# Patient Record
Sex: FEMALE | Race: WHITE | NOT HISPANIC OR LATINO | Employment: FULL TIME | URBAN - METROPOLITAN AREA
[De-identification: names, ages, dates, MRNs, and addresses within clinical notes are randomized per-mention and may not be internally consistent; named-entity substitution may affect disease eponyms.]

---

## 2017-01-05 ENCOUNTER — OFFICE VISIT (OUTPATIENT)
Dept: CARDIOLOGY | Facility: CLINIC | Age: 42
End: 2017-01-05

## 2017-01-05 VITALS
HEART RATE: 64 BPM | BODY MASS INDEX: 27.46 KG/M2 | DIASTOLIC BLOOD PRESSURE: 80 MMHG | WEIGHT: 155 LBS | SYSTOLIC BLOOD PRESSURE: 106 MMHG | HEIGHT: 63 IN

## 2017-01-05 DIAGNOSIS — R06.09 DYSPNEA ON EXERTION: Primary | ICD-10-CM

## 2017-01-05 DIAGNOSIS — Q24.9 CONGENITAL HEART DISEASE: ICD-10-CM

## 2017-01-05 PROCEDURE — 99214 OFFICE O/P EST MOD 30 MIN: CPT | Performed by: INTERNAL MEDICINE

## 2017-01-05 NOTE — MR AVS SNAPSHOT
Marine Tracey   1/5/2017 2:30 PM   Office Visit    Dept Phone:  [phone]   Encounter #:  [id number]    Provider:  GILES Chaidez   Department:  [facility]                Your Full Care Plan              Your Updated Medication List          This list is accurate as of: 1/5/17  2:44 PM.  Always use your most recent med list.                albuterol 108 (90 BASE) MCG/ACT inhaler   Commonly known as:  PROVENTIL HFA;VENTOLIN HFA   Inhale 2 puffs Every 4 (Four) Hours As Needed for shortness of air.       furosemide 40 MG tablet   Commonly known as:  LASIX       meloxicam 7.5 MG tablet   Commonly known as:  MOBIC   Take 1 tablet by mouth Daily.       MULTIVITAMIN WOMEN PO       omeprazole 40 MG capsule   Commonly known as:  priLOSEC       potassium chloride 10 MEQ CR capsule   Commonly known as:  MICRO-K       raNITIdine 150 MG tablet   Commonly known as:  ZANTAC       sertraline 100 MG tablet   Commonly known as:  ZOLOFT               We Performed the Following     Ambulatory Referral to Pulmonology       You Were Diagnosed With        Codes Comments    Dyspnea on exertion    -  Primary ICD-10-CM: R06.09  ICD-9-CM: 786.09       Instructions     None    Patient Instructions History      Upcoming Appointments     Visit Type Date Time Department    FOLLOW UP 1/5/2017  2:30 PM [facility]    FOLLOW UP 8/1/2017  3:30 PM [facility]      ENDYMIONhart Signup     Our records indicate that you have an active [employer] account.    You can view your After Visit Summary by going to [employer] and logging in with your [employer] username and password.  If you don't have a [employer] username and password but a parent or guardian has access to your record, the parent or guardian should login with their own [employer] username and password and access your record to view the After Visit Summary.    If you have questions, you can  "email [email or web address] or call [phone] to talk to our MyChart staff.  Remember, MyChart is NOT to be used for urgent needs.  For medical emergencies, dial 911.               Other Info from Your Visit           Your Appointments     Aug 01, 2017  3:30 PM EDT   Follow Up with Anirudh Wood MD   [facility] CARDIOLOGY (--)    [address] [place] [address] [address]  [facility] [address] [phone]           Arrive 15 minutes prior to appointment.              Allergies     No Known Allergies      Reason for Visit     Follow-up     Shortness of Breath     Edema legs    Chest Pain           Vital Signs     Blood Pressure Pulse Height Weight Body Mass Index Smoking Status    106/80 (BP Location: Left arm, Patient Position: Sitting) 64 62.5\" (158.8 cm) 155 lb (70.3 kg) 27.9 kg/m2 Never Smoker      Problems and Diagnoses Noted     Chest pain due to insufficient blood supply to heart    Breathlessness on exertion        "

## 2017-01-05 NOTE — ASSESSMENT & PLAN NOTE
· Echo and nuclear stress testing are unremarkable.  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  · Sleep study also recommended given reported history of nocturnal apnea

## 2017-01-05 NOTE — PROGRESS NOTES
Encounter Date:01/05/2017    Patient ID: Marine Tracey is a 41 y.o. 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  She she continues to have exertional shortness of breath which has not changed since I saw her last.  She also continues to have chest pain that occurs sporadically and resolved sporadically.  She does have to stop what she is doing when the chest pains occur however.  They can last for minutes or hours.  She states that her  notices her stop breathing at night.    Review of Systems   Constitution: Positive for weight gain. Negative for weakness and malaise/fatigue.   Eyes: Negative for vision loss in left eye and vision loss in right eye.   Cardiovascular: Positive for chest pain and leg swelling. Negative for dyspnea on exertion, near-syncope, orthopnea, palpitations, paroxysmal nocturnal dyspnea and syncope.        Waking at night with shortness of breath   Respiratory: Positive for shortness of breath and sleep disturbances due to breathing.    Musculoskeletal: Negative for myalgias.   Neurological: Negative for brief paralysis, excessive daytime sleepiness, focal weakness, numbness and paresthesias.   All other systems reviewed and are negative.      The patient's past medical, social, and family history reviewed in the patient's electronic medical record.    Allergies  Review of patient's allergies indicates no known allergies.    Outpatient Prescriptions Marked as Taking for the 1/5/17 encounter (Office Visit) with GILES Chaidez   Medication Sig Dispense Refill   • albuterol (PROVENTIL HFA;VENTOLIN HFA) 108 (90 BASE) MCG/ACT inhaler Inhale 2 puffs Every 4 (Four) Hours As Needed for shortness of air. 1 inhaler 0   • furosemide (LASIX) 40 MG tablet Take 40 mg by mouth Daily.     • meloxicam (MOBIC) 7.5 MG tablet  "Take 1 tablet by mouth Daily. 7 tablet 0   • Multiple Vitamins-Minerals (MULTIVITAMIN WOMEN PO) Take  by mouth Daily.     • omeprazole (PriLOSEC) 40 MG capsule Take 40 mg by mouth Daily.     • potassium chloride (MICRO-K) 10 MEQ CR capsule Take 10 mEq by mouth Daily.     • raNITIdine (ZANTAC) 150 MG tablet 2 (Two) Times a Day.  2   • sertraline (ZOLOFT) 100 MG tablet Take 100 mg by mouth Daily.           Blood pressure 106/80, pulse 64, height 62.5\" (158.8 cm), weight 155 lb (70.3 kg).  Body mass index is 27.9 kg/(m^2).    Physical Exam   Constitutional: She is oriented to person, place, and time. She appears well-developed and well-nourished.   HENT:   Head: Normocephalic and atraumatic.   Eyes: Pupils are equal, round, and reactive to light. No scleral icterus.   Neck: No JVD present. Carotid bruit is not present. No thyromegaly present.   Cardiovascular: Normal rate, regular rhythm, S1 normal and S2 normal.  Exam reveals no gallop.    No murmur heard.  Pulmonary/Chest: Effort normal and breath sounds normal.   Abdominal: Soft. There is no tenderness.   Neurological: She is alert and oriented to person, place, and time.   Skin: Skin is warm and dry. No cyanosis. Nails show no clubbing.   Psychiatric: She has a normal mood and affect. Her behavior is normal.       Data Review:   Procedures    Echo reviewed and showed normal LV systolic function.  There is no valvular abnormalities appreciated.  Normal RV with no evidence of pulmonary hypertension.    Nuclear stress test showed normal perfusion.  LVEF 62%    I would discuss the CT chest scan performed in October 2016 with Michael Connelly.  He did not appreciate any tracheal compression due to a vascular ring.  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         Problem List Items Addressed This Visit        Cardiology Problems    Congenital heart disease    Overview     · History of vascular ring status post repair at age 10 " months  · CT chest (10/25/16): Right sided aorta with mirror image anatomy.  No pulmonary embolus.  No evidence of tracheal embarrassment         Current Assessment & Plan     I discussed the patients October CT scan findings with Michael Connelly M.D. (radiology).  He did not see any embarrassment of the trachea by residual vascular ring.            Other    Dyspnea on exertion - Primary    Overview     · History of vascular ring status post repair at 10 months old  · Echo (12/15/16): LVEF = 55%.  Normal cardiac valves  · Pharmacologic nuclear stress (12/15/2017):  Normal perfusion.  LVEF 62%         Current Assessment & Plan     · Echo and nuclear stress testing are unremarkable.  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  · Sleep study also recommended given reported history of nocturnal apnea         Relevant Orders    Ambulatory Referral to Pulmonology               · Refer to pulmonary for evaluation of dyspnea and sleep study due to nocturnal apnea  · Return in about 6 months (around 7/5/2017).      Anirudh Wood MD  1/5/2017

## 2017-01-05 NOTE — ASSESSMENT & PLAN NOTE
I discussed the patients October CT scan findings with Michael Connelly M.D. (radiology).  He did not see any embarrassment of the trachea by residual vascular ring.

## 2017-01-05 NOTE — LETTER
January 5, 2017     William Gatica MD  [facility] [address]  [place] [address]    Patient: Marine Tracey   YOB: 1975   Date of Visit: 1/5/2017     Dear Dr. Gavi MD:    Thank you for referring Marine Tracey to me for evaluation. Below are the relevant portions of my assessment and plan of care.    If you have questions, please do not hesitate to call me. I look forward to following Marine along with you.         Sincerely,        GILES Heath        CC: No Recipients    Progress Notes:  Encounter Date:01/05/2017    Patient ID: Marine Tracey is a 41 y.o. 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  She she continues to have exertional shortness of breath which has not changed since I saw her last.  She also continues to have chest pain that occurs sporadically and resolved sporadically.  She does have to stop what she is doing when the chest pains occur however.  They can last for minutes or hours.  She states that her  notices her stop breathing at night.    Review of Systems   Constitution: Positive for weight gain. Negative for weakness and malaise/fatigue.   Eyes: Negative for vision loss in left eye and vision loss in right eye.   Cardiovascular: Positive for chest pain and leg swelling. Negative for dyspnea on exertion, near-syncope, orthopnea, palpitations, paroxysmal nocturnal dyspnea and syncope.        Waking at night with shortness of breath   Respiratory: Positive for shortness of breath and sleep disturbances due to breathing.    Musculoskeletal: Negative for myalgias.   Neurological: Negative for brief paralysis, excessive daytime sleepiness, focal weakness, numbness and paresthesias.   All other systems reviewed and are negative.      The patient's past medical, social, and  "family history reviewed in the patient's electronic medical record.    Allergies  Review of patient's allergies indicates no known allergies.    Outpatient Prescriptions Marked as Taking for the 1/5/17 encounter (Office Visit) with GILES Chaidez   Medication Sig Dispense Refill   • albuterol (PROVENTIL HFA;VENTOLIN HFA) 108 (90 BASE) MCG/ACT inhaler Inhale 2 puffs Every 4 (Four) Hours As Needed for shortness of air. 1 inhaler 0   • furosemide (LASIX) 40 MG tablet Take 40 mg by mouth Daily.     • meloxicam (MOBIC) 7.5 MG tablet Take 1 tablet by mouth Daily. 7 tablet 0   • Multiple Vitamins-Minerals (MULTIVITAMIN WOMEN PO) Take  by mouth Daily.     • omeprazole (PriLOSEC) 40 MG capsule Take 40 mg by mouth Daily.     • potassium chloride (MICRO-K) 10 MEQ CR capsule Take 10 mEq by mouth Daily.     • raNITIdine (ZANTAC) 150 MG tablet 2 (Two) Times a Day.  2   • sertraline (ZOLOFT) 100 MG tablet Take 100 mg by mouth Daily.           Blood pressure 106/80, pulse 64, height 62.5\" (158.8 cm), weight 155 lb (70.3 kg).  Body mass index is 27.9 kg/(m^2).    Physical Exam   Constitutional: She is oriented to person, place, and time. She appears well-developed and well-nourished.   HENT:   Head: Normocephalic and atraumatic.   Eyes: Pupils are equal, round, and reactive to light. No scleral icterus.   Neck: No JVD present. Carotid bruit is not present. No thyromegaly present.   Cardiovascular: Normal rate, regular rhythm, S1 normal and S2 normal.  Exam reveals no gallop.    No murmur heard.  Pulmonary/Chest: Effort normal and breath sounds normal.   Abdominal: Soft. There is no tenderness.   Neurological: She is alert and oriented to person, place, and time.   Skin: Skin is warm and dry. No cyanosis. Nails show no clubbing.   Psychiatric: She has a normal mood and affect. Her behavior is normal.       Data Review:   Procedures    Echo reviewed and showed normal LV systolic function.  There is no valvular abnormalities " appreciated.  Normal RV with no evidence of pulmonary hypertension.    Nuclear stress test showed normal perfusion.  LVEF 62%    I would discuss the CT chest scan performed in October 2016 with Michael Connelyl.  He did not appreciate any tracheal compression due to a vascular ring.  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         Problem List Items Addressed This Visit        Cardiology Problems    Congenital heart disease    Overview     · History of vascular ring status post repair at age 10 months  · CT chest (10/25/16): Right sided aorta with mirror image anatomy.  No pulmonary embolus.  No evidence of tracheal embarrassment         Current Assessment & Plan     I discussed the patients October CT scan findings with Michael Connelly M.D. (radiology).  He did not see any embarrassment of the trachea by residual vascular ring.            Other    Dyspnea on exertion - Primary    Overview     · History of vascular ring status post repair at 10 months old  · Echo (12/15/16): LVEF = 55%.  Normal cardiac valves  · Pharmacologic nuclear stress (12/15/2017):  Normal perfusion.  LVEF 62%         Current Assessment & Plan     · Echo and nuclear stress testing are unremarkable.  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  · Sleep study also recommended given reported history of nocturnal apnea         Relevant Orders    Ambulatory Referral to Pulmonology               · Refer to pulmonary for evaluation of dyspnea and sleep study due to nocturnal apnea  · Return in about 6 months (around 7/5/2017).      Anirudh Wood MD  1/5/2017

## 2017-02-01 ENCOUNTER — OFFICE VISIT (OUTPATIENT)
Dept: PULMONOLOGY | Facility: CLINIC | Age: 42
End: 2017-02-01

## 2017-02-01 VITALS
RESPIRATION RATE: 16 BRPM | HEART RATE: 75 BPM | WEIGHT: 156.4 LBS | BODY MASS INDEX: 27.71 KG/M2 | OXYGEN SATURATION: 97 % | DIASTOLIC BLOOD PRESSURE: 60 MMHG | HEIGHT: 63 IN | TEMPERATURE: 97.9 F | SYSTOLIC BLOOD PRESSURE: 104 MMHG

## 2017-02-01 DIAGNOSIS — G47.33 OSA (OBSTRUCTIVE SLEEP APNEA): ICD-10-CM

## 2017-02-01 DIAGNOSIS — K22.2 ESOPHAGEAL STRICTURE: ICD-10-CM

## 2017-02-01 DIAGNOSIS — R06.09 DYSPNEA ON EXERTION: Primary | ICD-10-CM

## 2017-02-01 PROCEDURE — 94200 LUNG FUNCTION TEST (MBC/MVV): CPT | Performed by: NURSE PRACTITIONER

## 2017-02-01 PROCEDURE — 94375 RESPIRATORY FLOW VOLUME LOOP: CPT | Performed by: NURSE PRACTITIONER

## 2017-02-01 PROCEDURE — 94729 DIFFUSING CAPACITY: CPT | Performed by: NURSE PRACTITIONER

## 2017-02-01 PROCEDURE — 99205 OFFICE O/P NEW HI 60 MIN: CPT | Performed by: NURSE PRACTITIONER

## 2017-02-01 PROCEDURE — 94726 PLETHYSMOGRAPHY LUNG VOLUMES: CPT | Performed by: NURSE PRACTITIONER

## 2017-02-01 NOTE — PROGRESS NOTES
[facility] Pulmonary Evaluation    CHIEF COMPLAINT    Dyspnea    HISTORY OF PRESENT ILLNESS    Marine Tracey is a 41 y.o.female here today for evaluation of some worsening dyspnea and atypical chest pain.  She was sent over by Dr. Wood after an initial cardiac evaluation with a normal echocardiogram and a negative stress test.  She's been having some worsening shortness of breath as well as some intermittent chest pain.  She has no cough or sputum production.  She does complain of some generalized fatigue and dyspnea with exertion.  She does feel that she can get a good breath at times.  The chest pain is difficult to explain, sometimes it's sharp sometimes it's a dull aching feeling.  It is mostly left-sided and under her left breast.   She does have a history of chronic GERD as well as a history of an esophageal dilatation in 2014,by Dr. Crabtree in [facility]  At that time she was unable to even swallow water without difficulty.  She's not sure the symptoms are related or the same as that time.  She's had no nausea vomiting or dysphagia.  On follow-up with her CT scan that she had in October she did have note of a hiatal hernia as well as some esophageal thickening or gastritis.  She takes a PPI daily as well as ranitidine for her reflux.  She does have an occasional reflux symptoms.    Her  does note that she has apnea episodes during the night and does waken her frequently.  She does not sleep well she awakens at night several times.  She does have daytime somnolence as well as fatigue.    Patient Active Problem List   Diagnosis   • RBBB (right bundle branch block)   • Dyspnea on exertion   • Congenital heart disease   • Esophageal stricture   • Atypical chest pain   • PASHA (obstructive sleep apnea)       No Known Allergies    Current Outpatient Prescriptions:   •  albuterol (PROVENTIL HFA;VENTOLIN HFA) 108 (90 BASE) MCG/ACT inhaler, Inhale 2 puffs Every 4 (Four) Hours As Needed for shortness of  "air., Disp: 1 inhaler, Rfl: 0  •  furosemide (LASIX) 40 MG tablet, Take 40 mg by mouth As Needed., Disp: , Rfl:   •  Multiple Vitamins-Minerals (MULTIVITAMIN WOMEN PO), Take  by mouth Daily., Disp: , Rfl:   •  omeprazole (PriLOSEC) 40 MG capsule, Take 40 mg by mouth Daily., Disp: , Rfl:   •  potassium chloride (MICRO-K) 10 MEQ CR capsule, Take 10 mEq by mouth As Needed., Disp: , Rfl:   •  raNITIdine (ZANTAC) 150 MG tablet, 2 (Two) Times a Day., Disp: , Rfl: 2  •  sertraline (ZOLOFT) 100 MG tablet, Take 100 mg by mouth Daily., Disp: , Rfl:   MEDICATION LIST AND ALLERGIES REVIEWED.    Social History   Substance Use Topics   • Smoking status: Former Smoker     Quit date: 2/1/1997   • Smokeless tobacco: Never Used   • Alcohol use No       FAMILY AND SOCIAL HISTORY REVIEWED.  Updated in [facility]  Father recently diagnosed with lymph angioleiomomatosis (by CT scan in [facility])    Review of Systems   Constitutional: Negative for chills, fatigue, fever and unexpected weight change.   HENT: Negative for congestion, nosebleeds, postnasal drip, rhinorrhea, sinus pressure and trouble swallowing.    Respiratory: Positive for cough. Negative for chest tightness, shortness of breath and wheezing.    Cardiovascular: Negative for chest pain and leg swelling.   Gastrointestinal: Negative for abdominal pain, constipation, diarrhea, nausea and vomiting.   Genitourinary: Negative for dysuria, frequency, hematuria and urgency.   Musculoskeletal: Negative for myalgias.   Neurological: Negative for dizziness, weakness, numbness and headaches.   All other systems reviewed and are negative.  .    Visit Vitals   • /60   • Pulse 75   • Temp 97.9 °F (36.6 °C)   • Resp 16   • Ht 63\" (160 cm)   • Wt 156 lb 6.4 oz (70.9 kg)   • SpO2 97%  Comment: RA   • BMI 27.71 kg/m2     Physical Exam   Constitutional: She is oriented to person, place, and time. She appears well-developed and well-nourished.   HENT:   Head: Normocephalic and " atraumatic.   Eyes: EOM are normal. Pupils are equal, round, and reactive to light.   Neck: Normal range of motion. Neck supple.   Cardiovascular: Normal rate and regular rhythm.    No murmur heard.  Pulmonary/Chest: Effort normal and breath sounds normal. No respiratory distress. She has no wheezes. She has no rales.   Abdominal: Soft. Bowel sounds are normal. She exhibits no distension.   Musculoskeletal: Normal range of motion. She exhibits no edema.   Neurological: She is alert and oriented to person, place, and time.   Skin: Skin is warm and dry. No erythema.   Psychiatric: She has a normal mood and affect. Her behavior is normal.   Vitals reviewed.      RESULTS    · Left ventricular function is normal. Estimated EF = 55%.  · The cardiac valves are anatomically and functionally normal  · RVSP 23  · Myocardial perfusion imaging indicates a normal myocardial perfusion study with no evidence of ischemia    CT Chest:   1. No evidence of pulmonary embolism.  2. Right-sided aortic arch with mirror-image anatomy.   3. Small hiatal hernia versus lower esophageal thickening/esophagitis.     PFTs in the office today showed an ratio 72%, FEV1 75%, total lung CA 5%, just diffusion 105%  Normal PFTs    Chest x-ray done in the office today was reviewed      PROBLEM LIST    Problem List Items Addressed This Visit        Respiratory    Dyspnea on exertion - Primary    Overview     · History of vascular ring status post repair at 10 months old  · Echo (12/15/16): LVEF = 55%.  Normal cardiac valves  · Pharmacologic nuclear stress (12/15/2017):  Normal perfusion.  LVEF 62%         Relevant Orders    XR Chest PA & Lateral (Completed)    Pulmonary Function Test (Completed)    Polysomnography 4 or More Parameters    PASHA (obstructive sleep apnea)    Relevant Orders    Home Sleep Study       Digestive    Esophageal stricture    Overview     · Status post dilation                 DISCUSSION    Sleep study due to nocturnal apnea noticed  by her  as well as daytime somnolence and fatigue.  Her PFTs show no obstruction or restriction, are normal functionality.  We reviewed her CT scan of the chest as well as her chest x-ray which have been normal lung parenchyma.    Follow-up with gastroenterology secondary to history of esophageal dilatation and with a comment of a hiatal hernia and esophagitis seen on her CT scan.  This may be the nature of her sporadic chest pain or discomfort.  She will follow-up with Dr. Crabtree in [facility].    Follow up in 6 weeks or after sleep study.    Cecilia Puga, GILES  02/01/20172:01 PM  Electronically signed     Please note that portions of this note were completed with a voice recognition program. Efforts were made to edit the dictations, but occasionally words are mistranscribed.      CC: William Gatica MD

## 2017-02-02 PROBLEM — G47.33 OSA (OBSTRUCTIVE SLEEP APNEA): Status: ACTIVE | Noted: 2017-02-02

## 2017-02-10 ENCOUNTER — HOSPITAL ENCOUNTER (OUTPATIENT)
Dept: SLEEP MEDICINE | Facility: HOSPITAL | Age: 42
Discharge: HOME | End: 2017-02-10
Admitting: NURSE PRACTITIONER

## 2017-02-10 VITALS
OXYGEN SATURATION: 98 % | WEIGHT: 155.4 LBS | DIASTOLIC BLOOD PRESSURE: 52 MMHG | HEIGHT: 63 IN | HEART RATE: 88 BPM | SYSTOLIC BLOOD PRESSURE: 97 MMHG | BODY MASS INDEX: 27.54 KG/M2

## 2017-02-10 DIAGNOSIS — G47.33 OSA (OBSTRUCTIVE SLEEP APNEA): ICD-10-CM

## 2017-02-10 PROCEDURE — 95806 SLEEP STUDY UNATT&RESP EFFT: CPT | Performed by: INTERNAL MEDICINE

## 2017-05-04 ENCOUNTER — OFFICE VISIT (OUTPATIENT)
Dept: PULMONOLOGY | Facility: CLINIC | Age: 42
End: 2017-05-04

## 2017-05-04 DIAGNOSIS — K22.2 ESOPHAGEAL STRICTURE: ICD-10-CM

## 2017-05-04 DIAGNOSIS — R06.09 DYSPNEA ON EXERTION: Primary | ICD-10-CM

## 2017-05-04 DIAGNOSIS — R06.83 SNORING: ICD-10-CM

## 2017-05-04 DIAGNOSIS — G47.33 OSA (OBSTRUCTIVE SLEEP APNEA): ICD-10-CM

## 2017-05-04 PROCEDURE — 99213 OFFICE O/P EST LOW 20 MIN: CPT | Performed by: NURSE PRACTITIONER

## 2017-10-13 ENCOUNTER — OFFICE VISIT (OUTPATIENT)
Dept: PULMONOLOGY | Facility: CLINIC | Age: 42
End: 2017-10-13

## 2017-10-13 VITALS
RESPIRATION RATE: 14 BRPM | TEMPERATURE: 97.2 F | DIASTOLIC BLOOD PRESSURE: 65 MMHG | WEIGHT: 158 LBS | BODY MASS INDEX: 28 KG/M2 | HEART RATE: 67 BPM | HEIGHT: 63 IN | SYSTOLIC BLOOD PRESSURE: 100 MMHG | OXYGEN SATURATION: 99 %

## 2017-10-13 DIAGNOSIS — I45.10 RIGHT BUNDLE BRANCH BLOCK: Primary | ICD-10-CM

## 2017-10-13 DIAGNOSIS — Q24.9 CONGENITAL HEART DISEASE: ICD-10-CM

## 2017-10-13 DIAGNOSIS — G47.33 OSA (OBSTRUCTIVE SLEEP APNEA): ICD-10-CM

## 2017-10-13 DIAGNOSIS — R06.09 DYSPNEA ON EXERTION: Primary | ICD-10-CM

## 2017-10-13 DIAGNOSIS — R06.83 SNORING: ICD-10-CM

## 2017-10-13 PROCEDURE — 99214 OFFICE O/P EST MOD 30 MIN: CPT | Performed by: NURSE PRACTITIONER

## 2017-10-13 PROCEDURE — 94620 PR PULMONARY STRESS TESTING,SIMPLE: CPT | Performed by: NURSE PRACTITIONER

## 2017-10-13 RX ORDER — ALBUTEROL SULFATE 90 UG/1
2 AEROSOL, METERED RESPIRATORY (INHALATION) EVERY 4 HOURS PRN
Qty: 1 INHALER | Refills: 3 | Status: SHIPPED | OUTPATIENT
Start: 2017-10-13 | End: 2018-12-29

## 2017-10-13 NOTE — PROGRESS NOTES
[facility], [place], operated by [facility] Follow up    CHIEF COMPLAINT    Shortness of air    HISTORY OF PRESENT ILLNESS    Marine Tracey is a 42 y.o.female here today for three-month follow-up on her evaluation for dyspnea.  I initially saw her for evaluation in February.  She has had some fairly normal PFTs with some mild obstruction and some blunted flow volume loops but coughs throughout testing.  Her CT scan and chest x-ray had no acute findings in her lung parenchyma, with a chronically asymmetric left volume loss from general surgery.  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  She has followed up with Dr. Perez with echocardiogram as well as stress test.  Her ejection fraction was 55% with normal valves.  The right atrium and right ventricle were normal, her RV systolic pressure was 23.    She did have a sleep study that showed some mild obstructive sleep apnea and at bedtime 13.  She is on a CPAP now.  She works during the night and wears her CPAP during the day as tolerated.    She also has a history of esophageal stricture with dilatation and a hiatal hernia.  She follows up with gastroenterology.  She is on a PPI.    She comes in today for follow-up she still complains of continued dyspnea on exertion with lightheadedness and dizziness if she continues to proceed with activity.  She does have to stop and rest frequently.  Both at work and while playing with her grandchildren.  She has no cough or sputum production.  She denies any wheezing.  She does complain of having some tightness in her chest.        Patient Active Problem List   Diagnosis   • Right bundle branch block   • Dyspnea on exertion   • Congenital heart disease   • Esophageal stricture   • Atypical chest pain   • PASHA (obstructive sleep apnea)   • Snoring       No Known Allergies    Current Outpatient  "Prescriptions:   •  albuterol (PROVENTIL HFA;VENTOLIN HFA) 108 (90 BASE) MCG/ACT inhaler, Inhale 2 puffs Every 4 (Four) Hours As Needed for shortness of air., Disp: 1 inhaler, Rfl: 0  •  furosemide (LASIX) 40 MG tablet, Take 40 mg by mouth As Needed., Disp: , Rfl:   •  Multiple Vitamins-Minerals (MULTIVITAMIN WOMEN PO), Take  by mouth Daily., Disp: , Rfl:   •  omeprazole (PriLOSEC) 40 MG capsule, Take 40 mg by mouth Daily., Disp: , Rfl:   •  potassium chloride (MICRO-K) 10 MEQ CR capsule, Take 10 mEq by mouth As Needed., Disp: , Rfl:   •  raNITIdine (ZANTAC) 150 MG tablet, 2 (Two) Times a Day., Disp: , Rfl: 2  •  sertraline (ZOLOFT) 100 MG tablet, Take 100 mg by mouth Daily., Disp: , Rfl:   MEDICATION LIST AND ALLERGIES REVIEWED.    Social History   Substance Use Topics   • Smoking status: Former Smoker     Quit date: 2/1/1997   • Smokeless tobacco: Never Used   • Alcohol use No       FAMILY AND SOCIAL HISTORY REVIEWED.    Review of Systems   Constitutional: Positive for fatigue. Negative for chills, fever and unexpected weight change.   HENT: Negative for congestion, nosebleeds, postnasal drip, rhinorrhea, sinus pressure and trouble swallowing.    Respiratory: Positive for shortness of breath. Negative for cough, chest tightness and wheezing.         Chest tightness   Cardiovascular: Negative for chest pain, palpitations and leg swelling.   Gastrointestinal: Negative for abdominal pain, constipation, diarrhea, nausea and vomiting.   Genitourinary: Negative for dysuria, frequency, hematuria and urgency.   Musculoskeletal: Negative for myalgias.   Neurological: Positive for light-headedness. Negative for dizziness, weakness, numbness and headaches.   All other systems reviewed and are negative.  .    /65  Pulse 67  Temp 97.2 °F (36.2 °C)  Resp 14  Ht 63\" (160 cm)  Wt 158 lb (71.7 kg)  SpO2 99% Comment: RA  BMI 27.99 kg/m2    Physical Exam   Constitutional: She is oriented to person, place, and time. She " appears well-developed and well-nourished.   HENT:   Head: Normocephalic and atraumatic.   Eyes: EOM are normal. Pupils are equal, round, and reactive to light.   Neck: Normal range of motion. Neck supple.   Cardiovascular: Normal rate and regular rhythm.    No murmur heard.  Pulmonary/Chest: Effort normal and breath sounds normal. No respiratory distress. She has no wheezes. She has no rales.   Abdominal: Soft. Bowel sounds are normal. She exhibits no distension.   Musculoskeletal: Normal range of motion. She exhibits no edema.   Neurological: She is alert and oriented to person, place, and time.   Skin: Skin is warm and dry. No erythema.   Psychiatric: She has a normal mood and affect. Her behavior is normal.   Vitals reviewed.        RESULTS        PROBLEM LIST    Problem List Items Addressed This Visit        Respiratory    Dyspnea on exertion - Primary    Overview     · History of vascular ring status post repair at 10 months old  · Echo (12/15/16): LVEF = 55%.  Normal cardiac valves  · Pharmacologic nuclear stress (12/15/2017):  Normal perfusion.  LVEF 62%         PASHA (obstructive sleep apnea)    Overview     Home Sleep Study 2/10/17 - Mild PASHA - AHI 13         Snoring            DISCUSSION    Mrs. Tracey has a fairly complicated case, with continued dyspnea with exertion as well as complaints of chest tightness and lightheadedness.  She has been on Breo now as well as albuterol without any relief in her symptoms.  Her flow volume loops appear had a very very mild obstruction and some blunting but she coughed throughout the procedure.  I would like to repeat her PFTs on her next visit to see if this has resolved with the resolution of the cough.  She has no evidence of any tracheal stenosis or dynamic airway collapse.  She has no cough or no wheezing.  She is being treated for objective sleep apnea with a CPAP, she does wear it sporadically during the day, she works at night.  Her GERD is well controlled at  this time on a PPI and reflux precautions.    With her history of some congenital surgery at 10 months and may be of benefit to have a right heart catheterization to rule out any pulmonary hypertension.  Her echocardiogram had a normal RV systolic pressure, but it may be nice to make sure that we'll have any underlying pulmonary hypertension as a cause for her dyspnea on exertion, lightheadedness with dizziness, and chest pressure.  She has followed up with Dr. Davies in the past with [facility] cardiology, I'll refer her back to him for his thoughts.    If her heart cavitation is again negative or nonrevealing, her next course of action would be to proceed with bronchoscopy for airway visualization.    This was all discussed with the patient today in the office.  She is in full agreement.    At this time will continue on inhaled combination therapy as well as albuterol as needed to see if it helps any of her symptoms.    Cecilia Puga, APRN  10/13/24417:02 AM  Electronically signed     Please note that portions of this note were completed with a voice recognition program. Efforts were made to edit the dictations, but occasionally words are mistranscribed.      CC: William Gatica MD

## 2017-10-17 ENCOUNTER — OFFICE VISIT (OUTPATIENT)
Dept: CARDIOLOGY | Facility: CLINIC | Age: 42
End: 2017-10-17

## 2017-10-17 VITALS
HEIGHT: 63 IN | SYSTOLIC BLOOD PRESSURE: 98 MMHG | WEIGHT: 160 LBS | HEART RATE: 65 BPM | DIASTOLIC BLOOD PRESSURE: 62 MMHG | BODY MASS INDEX: 28.35 KG/M2

## 2017-10-17 DIAGNOSIS — R07.9 CHEST PAIN, UNSPECIFIED TYPE: ICD-10-CM

## 2017-10-17 DIAGNOSIS — Q24.9 CONGENITAL HEART DISEASE: Primary | ICD-10-CM

## 2017-10-17 DIAGNOSIS — R06.09 DYSPNEA ON EXERTION: ICD-10-CM

## 2017-10-17 PROCEDURE — 99214 OFFICE O/P EST MOD 30 MIN: CPT | Performed by: INTERNAL MEDICINE

## 2017-10-17 RX ORDER — DULOXETIN HYDROCHLORIDE 60 MG/1
60 CAPSULE, DELAYED RELEASE ORAL DAILY
COMMUNITY

## 2017-10-17 NOTE — ASSESSMENT & PLAN NOTE
The patient has a history of congenital heart disease with life limiting atypical chest pain and shortness of breath.  Despite recent testing suggesting no significant cardiac abnormality, I have concern her symptoms may be a manifestation of her congenital heart disease.  I like to seek opinion from an adult congenital heart disease specialist and in referring her to Dr. Andrew Leventhal at  for evaluation.  Cardiac MRI was contemplated today, but I will defer to Dr. Leventhal.

## 2017-10-17 NOTE — ASSESSMENT & PLAN NOTE
· Functional class III symptoms.  · If cardiac evaluation unrevealing, will refer back to the pulmonary for bronchoscopy.

## 2017-10-17 NOTE — PROGRESS NOTES
"Encounter Date:10/17/2017    Patient ID: Marine Tracey is a 42 y.o. female who resides in [place], [place]. She works in the [employer].    CC/Reason for visit:  Shortness of Breath; Chest Pain (Chest pressure ); Fatigue; Dizziness; and RBBB            Marine Tracey returns to the office today in follow-up of her congenital heart disease, chest pain, and shortness of breath.  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  She remembers being told that she had a vascular ring.  A recent CT shows right sided aorta with mirror image anatomy.  When I saw her for initial evaluation approximately one year ago, she underwent an echocardiogram and nuclear stress test.  Both tests appeared normal.  She was referred to pulmonary and is undergone a pulmonary evaluation which has not yielded pathology to account for her symptoms.    The patient states that she has intermittent chest pain symptoms which occur both at rest and with exertion and will require her to sit down when they occur.  They resolve spontaneously after several minutes.  She also complains of poor exercise capacity.  She is becoming limited in her ability to physically function at work and has had to really get herself to \"camera duty\" to avoid physical activities.  She denies orthopnea or PND.  She denies palpitations.    Review of Systems   Constitution: Negative for weakness and malaise/fatigue.   Eyes: Negative for vision loss in left eye and vision loss in right eye.   Cardiovascular: Positive for chest pain and leg swelling. Negative for dyspnea on exertion, near-syncope, orthopnea, palpitations, paroxysmal nocturnal dyspnea and syncope.   Respiratory: Positive for shortness of breath, sleep disturbances due to breathing and snoring.    Musculoskeletal: Negative for myalgias.   Gastrointestinal: Positive for " "heartburn.   Genitourinary: Positive for frequency.   Neurological: Positive for dizziness. Negative for brief paralysis, excessive daytime sleepiness, focal weakness, numbness and paresthesias.   All other systems reviewed and are negative.      The patient's past medical, social, family history and ROS reviewed in the patient's electronic medical record.    Allergies  Review of patient's allergies indicates no known allergies.    Outpatient Prescriptions Marked as Taking for the 10/17/17 encounter (Office Visit) with Anirudh Wood MD   Medication Sig Dispense Refill   • albuterol (PROVENTIL HFA;VENTOLIN HFA) 108 (90 Base) MCG/ACT inhaler Inhale 2 puffs Every 4 (Four) Hours As Needed for Shortness of Air. 1 inhaler 3   • DULoxetine (CYMBALTA) 60 MG capsule Take 60 mg by mouth Daily.     • Multiple Vitamins-Minerals (MULTIVITAMIN WOMEN PO) Take  by mouth Daily.     • omeprazole (PriLOSEC) 40 MG capsule Take 40 mg by mouth Daily.     • raNITIdine (ZANTAC) 150 MG tablet 2 (Two) Times a Day.  2   • [DISCONTINUED] sertraline (ZOLOFT) 100 MG tablet Take 100 mg by mouth Daily.           Blood pressure 98/62, pulse 65, height 63\" (160 cm), weight 160 lb (72.6 kg).  Body mass index is 28.34 kg/(m^2).    Physical Exam   Constitutional: She is oriented to person, place, and time. She appears well-developed and well-nourished.   HENT:   Head: Normocephalic and atraumatic.   Eyes: Pupils are equal, round, and reactive to light. No scleral icterus.   Neck: No JVD present. Carotid bruit is not present. No thyromegaly present.   Cardiovascular: Normal rate, regular rhythm, S1 normal and S2 normal.  Exam reveals no gallop.    No murmur heard.  Pulmonary/Chest: Effort normal and breath sounds normal.   Abdominal: Soft. There is no hepatosplenomegaly. There is no tenderness.   Neurological: She is alert and oriented to person, place, and time.   Skin: Skin is warm and dry. No cyanosis. Nails show no clubbing. "   Psychiatric: She has a normal mood and affect. Her behavior is normal.       Data Review:   Procedures       Problem List Items Addressed This Visit        Cardiovascular and Mediastinum    Congenital heart disease - Primary    Overview     · History of vascular ring status post repair at age 10 months  · CT chest (10/25/16): Right sided aorta with mirror image anatomy.  No pulmonary embolus.  No evidence of tracheal embarrassment  · Echo (12/15/16): LVEF = 55%.  Normal cardiac valves.  Normal RVSP.  · Pharmacologic nuclear stress (12/15/2017):  Normal perfusion.  LVEF 62%         Current Assessment & Plan     The patient has a history of congenital heart disease with life limiting atypical chest pain and shortness of breath.  Despite recent testing suggesting no significant cardiac abnormality, I have concern her symptoms may be a manifestation of her congenital heart disease.  I like to seek opinion from an adult congenital heart disease specialist and in referring her to Dr. Andrew Leventhal at  for evaluation.  Cardiac MRI was contemplated today, but I will defer to Dr. Leventhal.         Relevant Orders    Ambulatory Referral to Cardiology (Completed)       Respiratory    Dyspnea on exertion    Overview     · History of vascular ring status post repair at 10 months old  · Echo (12/15/16): LVEF = 55%.  Normal cardiac valves.  Normal RVSP.  · Pharmacologic nuclear stress (12/15/2017):  Normal perfusion.  LVEF 62%         Current Assessment & Plan     · Functional class III symptoms.  · If cardiac evaluation unrevealing, will refer back to the pulmonary for bronchoscopy.            Nervous and Auditory    Chest pain    Overview     · Nuclear Stress Test (2/6/2014): Normal perfusion.  Normal LVEF  · CT Chest Angiogram with Contrast (10/26/2016): No pulmonary embolus.  Right-sided aortic arch with mirror image anatomy.  · Echocardiogram (11/3/2016): LVEF 55%.  Cardiac.  Anatomically and functionally normal.   RVSP 26 mmHg                    · Refer to Dr. Andrew Leventhal for evaluation of adult congenital heart disease evaluation  · If cardiac evaluation unremarkable, will be referred back to pulmonary for possible bronchoscopy.    Anirudh Wood MD  10/17/2017     Scribed for Anirudh Wood MD by Tapan Houston. 10/17/2017  1:14 PM

## 2018-12-29 ENCOUNTER — OFFICE VISIT (OUTPATIENT)
Dept: RETAIL CLINIC | Facility: CLINIC | Age: 43
End: 2018-12-29

## 2018-12-29 VITALS
DIASTOLIC BLOOD PRESSURE: 70 MMHG | WEIGHT: 158 LBS | OXYGEN SATURATION: 98 % | HEART RATE: 80 BPM | RESPIRATION RATE: 12 BRPM | SYSTOLIC BLOOD PRESSURE: 121 MMHG | TEMPERATURE: 97.6 F | BODY MASS INDEX: 28 KG/M2 | HEIGHT: 63 IN

## 2018-12-29 DIAGNOSIS — R05.9 COUGHING: ICD-10-CM

## 2018-12-29 DIAGNOSIS — J01.41 ACUTE RECURRENT PANSINUSITIS: Primary | ICD-10-CM

## 2018-12-29 PROCEDURE — 99213 OFFICE O/P EST LOW 20 MIN: CPT | Performed by: NURSE PRACTITIONER

## 2018-12-29 RX ORDER — AMOXICILLIN AND CLAVULANATE POTASSIUM 875; 125 MG/1; MG/1
1 TABLET, FILM COATED ORAL 2 TIMES DAILY
Qty: 20 TABLET | Refills: 0 | Status: SHIPPED | OUTPATIENT
Start: 2018-12-29

## 2018-12-29 RX ORDER — DEXTROMETHORPHAN HYDROBROMIDE AND PROMETHAZINE HYDROCHLORIDE 15; 6.25 MG/5ML; MG/5ML
5 SYRUP ORAL 4 TIMES DAILY PRN
Qty: 240 ML | Refills: 0 | Status: SHIPPED | OUTPATIENT
Start: 2018-12-29 | End: 2019-01-08

## 2018-12-29 RX ORDER — PANTOPRAZOLE SODIUM 40 MG/1
40 TABLET, DELAYED RELEASE ORAL 2 TIMES DAILY
Refills: 1 | COMMUNITY
Start: 2018-11-30

## 2018-12-29 RX ORDER — PSEUDOEPHEDRINE HCL 120 MG/1
120 TABLET, FILM COATED, EXTENDED RELEASE ORAL EVERY 12 HOURS
Qty: 20 TABLET | Refills: 0 | Status: SHIPPED | OUTPATIENT
Start: 2018-12-29 | End: 2019-01-08

## 2018-12-29 RX ORDER — PREDNISONE 10 MG/1
TABLET ORAL DAILY
Qty: 21 EACH | Refills: 0 | Status: SHIPPED | OUTPATIENT
Start: 2018-12-29 | End: 2019-01-04

## 2018-12-29 NOTE — PROGRESS NOTES
"Subjective   Marine Tracey is a 43 y.o. female.     Sinusitis   This is a recurrent problem. The current episode started in the past 7 days. The problem has been gradually worsening since onset. There has been no fever. The pain is severe. Associated symptoms include congestion, coughing, headaches, a hoarse voice, sinus pressure and swollen glands. Pertinent negatives include no chills, ear pain, neck pain, shortness of breath, sneezing or sore throat. Past treatments include nothing.        The following portions of the patient's history were reviewed and updated as appropriate: allergies, current medications, past medical history, past social history, past surgical history and problem list.    Review of Systems   Constitutional: Negative for appetite change, chills and fever.   HENT: Positive for congestion, hoarse voice, postnasal drip, rhinorrhea, sinus pressure and sinus pain. Negative for ear pain, sneezing, sore throat and trouble swallowing.    Eyes: Negative.    Respiratory: Positive for cough and chest tightness. Negative for shortness of breath and wheezing.    Cardiovascular: Negative.    Gastrointestinal: Negative for abdominal pain, diarrhea, nausea and vomiting.   Musculoskeletal: Negative.  Negative for neck pain.   Skin: Negative.    Neurological: Positive for headaches. Negative for dizziness.   Hematological: Positive for adenopathy.        /70   Pulse 80   Temp 97.6 °F (36.4 °C) (Oral)   Resp 12   Ht 160 cm (63\")   Wt 71.7 kg (158 lb)   SpO2 98%   BMI 27.99 kg/m²      Objective   Physical Exam   Constitutional: She is oriented to person, place, and time. Vital signs are normal. She appears well-developed and well-nourished.   HENT:   Head: Normocephalic.   Right Ear: External ear and ear canal normal. No drainage, swelling or tenderness. Tympanic membrane is bulging. Tympanic membrane is not erythematous.   Left Ear: External ear and ear canal normal. No drainage, swelling or " tenderness. Tympanic membrane is bulging. Tympanic membrane is not erythematous.   Nose: Mucosal edema and rhinorrhea present. Right sinus exhibits maxillary sinus tenderness and frontal sinus tenderness. Left sinus exhibits maxillary sinus tenderness and frontal sinus tenderness.   Mouth/Throat: Uvula is midline, oropharynx is clear and moist and mucous membranes are normal. Tonsils are 0 on the right. Tonsils are 0 on the left. No tonsillar exudate.   Eyes: Conjunctivae are normal. Pupils are equal, round, and reactive to light.   Cardiovascular: Normal rate, regular rhythm, S1 normal, S2 normal and normal heart sounds.   Pulmonary/Chest: Effort normal and breath sounds normal. No respiratory distress. She has no wheezes.   Lymphadenopathy:        Head (right side): Tonsillar adenopathy present.        Head (left side): Tonsillar adenopathy present.     She has no cervical adenopathy.   Neurological: She is alert and oriented to person, place, and time.   Skin: Skin is warm, dry and intact. No rash noted.   Psychiatric: She has a normal mood and affect. Her speech is normal and behavior is normal. Thought content normal.   Vitals reviewed.      Assessment/Plan   Marine was seen today for sinusitis.    Diagnoses and all orders for this visit:    Acute recurrent pansinusitis  -     amoxicillin-clavulanate (AUGMENTIN) 875-125 MG per tablet; Take 1 tablet by mouth 2 (Two) Times a Day.  -     pseudoephedrine (SUDAFED) 120 MG 12 hr tablet; Take 1 tablet by mouth Every 12 (Twelve) Hours for 10 days.  -     PredniSONE (DELTASONE) 10 MG (21) tablet pack; Take  by mouth Daily for 6 days. Use as directed on package    Coughing  -     PredniSONE (DELTASONE) 10 MG (21) tablet pack; Take  by mouth Daily for 6 days. Use as directed on package  -     promethazine-dextromethorphan (PROMETHAZINE-DM) 6.25-15 MG/5ML syrup; Take 5 mL by mouth 4 (Four) Times a Day As Needed for Cough for up to 10 days.

## 2020-01-13 ENCOUNTER — TREATMENT (OUTPATIENT)
Dept: PHYSICAL THERAPY | Facility: CLINIC | Age: 45
End: 2020-01-13

## 2020-01-13 ENCOUNTER — TRANSCRIBE ORDERS (OUTPATIENT)
Dept: PHYSICAL THERAPY | Facility: CLINIC | Age: 45
End: 2020-01-13

## 2020-01-13 DIAGNOSIS — M65.4 DE QUERVAIN'S TENOSYNOVITIS, RIGHT: Primary | ICD-10-CM

## 2020-01-13 DIAGNOSIS — M65.4 DE QUERVAIN'S DISEASE (TENOSYNOVITIS): Primary | ICD-10-CM

## 2020-01-13 PROCEDURE — L3906 WHO W/O JOINTS CF: HCPCS | Performed by: PHYSICAL THERAPIST

## 2020-01-15 NOTE — PROGRESS NOTES
Marine BOLDEN Alexy 1975   Diagnosis/ Surgery: Right DeQuervain's Syndrome/wrist pain          Date Of Onset: Spring 2019    Date Of Surgery:Injection today    Hand Dominance: Right  History of Present Condition: pain started about 8 months ago when she was straightening up a bed spread. Had 1 injection about 6 months ago helped for about 4 months.  Injected today, sent for immobilizing splinting.  Medical/Vocational History/ Medications: PMH unremarkable.  Works at [employer].    Pain: 7-8/10    Edema: Mild  Sensibility: WNL   Wound Status:N/A  ROM/ Strength: Finkelstein's test +    Splinting:  · Patient was measure and fit with a custom fabricated volar forearm based wrist/hand/thumb immobilization splint with IP joint free.   · Patient was instructed in wearing schedule, precautions and care of the splint during this visit.   · Patient was instructed in proper donning/doffing of splint.   Assessment:  · Patient was fitted and appropriate splint was fabricated this date.  · Patient reported that splint was comfortable and had no complications with the fit of the splint.  · Patient was instructed and patient verbalized understanding of precautions, wear and care of the splint.   · Patient demonstrated independent donning/doffing of splint during treatment today.  Goals:  · Patient was fitted properly with appropriate splint for diagnosis  · Patient was educated on precautions, wear schedule and care of splint  · Patient demonstrated independence with donning/doffing of the splint.  · Splint was provided to Protect Healing Structures, Restrict Mobility, Improve joint alignment.  Plan:  · No additional treatment is required for this patient at this time. The patient is therefore discharged from therapy.  · Patient advised to contact therapist with any additional questions or concerns regarding the fit and function of the splint.  · Patient will be seen for splint issues as needed   · Wear  Instructions: Off for hygiene       PT SIGNATURE: Jerry Carlin, PT, CHT  DATE TREATMENT INITIATED: 1/13/2020

## 2020-07-15 ENCOUNTER — HOSPITAL ENCOUNTER (OUTPATIENT)
Age: 45
End: 2020-07-15
Payer: COMMERCIAL

## 2020-07-15 DIAGNOSIS — N23: Primary | ICD-10-CM

## 2020-07-15 PROCEDURE — 74176 CT ABD & PELVIS W/O CONTRAST: CPT

## 2020-07-23 ENCOUNTER — HOSPITAL ENCOUNTER (OUTPATIENT)
Age: 45
End: 2020-07-23
Payer: COMMERCIAL

## 2020-07-23 DIAGNOSIS — R31.0: Primary | ICD-10-CM

## 2020-07-23 PROCEDURE — 87186 SC STD MICRODIL/AGAR DIL: CPT

## 2020-07-23 PROCEDURE — 87088 URINE BACTERIA CULTURE: CPT

## 2020-07-23 PROCEDURE — 87086 URINE CULTURE/COLONY COUNT: CPT

## 2020-07-30 ENCOUNTER — HOSPITAL ENCOUNTER (OUTPATIENT)
Age: 45
End: 2020-07-30
Payer: COMMERCIAL

## 2020-07-30 DIAGNOSIS — Z01.818: Primary | ICD-10-CM

## 2020-07-30 DIAGNOSIS — R31.9: ICD-10-CM

## 2020-07-30 PROCEDURE — 36415 COLL VENOUS BLD VENIPUNCTURE: CPT

## 2020-07-30 PROCEDURE — 86328 IA NFCT AB SARSCOV2 COVID19: CPT

## 2020-07-31 ENCOUNTER — HOSPITAL ENCOUNTER (OUTPATIENT)
Dept: HOSPITAL 22 - OUTP | Age: 45
Discharge: HOME | End: 2020-07-31
Payer: COMMERCIAL

## 2020-07-31 VITALS — BODY MASS INDEX: 25.7 KG/M2

## 2020-07-31 VITALS
OXYGEN SATURATION: 100 % | SYSTOLIC BLOOD PRESSURE: 100 MMHG | TEMPERATURE: 98.6 F | HEART RATE: 68 BPM | RESPIRATION RATE: 18 BRPM | DIASTOLIC BLOOD PRESSURE: 68 MMHG

## 2020-07-31 VITALS
HEART RATE: 74 BPM | OXYGEN SATURATION: 98 % | SYSTOLIC BLOOD PRESSURE: 93 MMHG | DIASTOLIC BLOOD PRESSURE: 61 MMHG | TEMPERATURE: 97.9 F | RESPIRATION RATE: 18 BRPM

## 2020-07-31 DIAGNOSIS — Q33.6: ICD-10-CM

## 2020-07-31 DIAGNOSIS — Z79.899: ICD-10-CM

## 2020-07-31 DIAGNOSIS — Z83.3: ICD-10-CM

## 2020-07-31 DIAGNOSIS — I51.9: ICD-10-CM

## 2020-07-31 DIAGNOSIS — K21.9: ICD-10-CM

## 2020-07-31 DIAGNOSIS — R31.29: ICD-10-CM

## 2020-07-31 DIAGNOSIS — Z85.9: ICD-10-CM

## 2020-07-31 DIAGNOSIS — Z87.891: ICD-10-CM

## 2020-07-31 DIAGNOSIS — N35.92: Primary | ICD-10-CM

## 2020-07-31 DIAGNOSIS — Z82.49: ICD-10-CM

## 2020-07-31 DIAGNOSIS — Z90.710: ICD-10-CM

## 2020-07-31 PROCEDURE — 52344 CYSTO/URETERO STRICTURE TX: CPT

## 2020-07-31 PROCEDURE — 0TJB8ZZ INSPECTION OF BLADDER, VIA NATURAL OR ARTIFICIAL OPENING ENDOSCOPIC: ICD-10-PCS | Performed by: UROLOGY

## 2021-01-27 ENCOUNTER — HOSPITAL ENCOUNTER (OUTPATIENT)
Age: 46
End: 2021-01-27
Payer: COMMERCIAL

## 2021-01-27 DIAGNOSIS — R10.9: Primary | ICD-10-CM

## 2021-01-27 DIAGNOSIS — R31.9: ICD-10-CM

## 2021-01-27 PROCEDURE — 74176 CT ABD & PELVIS W/O CONTRAST: CPT

## 2021-01-28 ENCOUNTER — HOSPITAL ENCOUNTER (OUTPATIENT)
Age: 46
End: 2021-01-28
Payer: COMMERCIAL

## 2021-01-28 DIAGNOSIS — K52.9: Primary | ICD-10-CM

## 2021-01-28 PROCEDURE — 87507 IADNA-DNA/RNA PROBE TQ 12-25: CPT

## 2021-06-24 ENCOUNTER — HOSPITAL ENCOUNTER (OUTPATIENT)
Age: 46
End: 2021-06-24
Payer: COMMERCIAL

## 2021-06-24 DIAGNOSIS — Z20.822: Primary | ICD-10-CM

## 2021-06-24 DIAGNOSIS — U07.1: ICD-10-CM

## 2021-06-24 LAB
COLOR UR: YELLOW
HCT VFR BLD CALC: 38.5 % (ref 37–47)
HGB BLD-MCNC: 13.4 G/DL (ref 12.2–16.2)
MCHC RBC-ENTMCNC: 34.9 G/DL (ref 31.8–35.4)
MCV RBC: 81.8 FL (ref 81–99)
MEAN CORPUSCULAR HEMOGLOBIN: 28.5 PG (ref 27–31.2)
MICRO URNS: (no result)
PH UR: 5.5 [PH] (ref 5–8.5)
PLATELET # BLD: 249 K/MM3 (ref 142–424)
RBC # BLD AUTO: 4.7 M/MM3 (ref 4.2–5.4)
SP GR UR: 1.01 (ref 1–1.03)
SQUAMOUS URNS QL MICRO: (no result) #/HPF (ref 0–5)
UROBILINOGEN UR QL: 0.2 EU/DL
WBC # BLD AUTO: 7.9 K/MM3 (ref 4.8–10.8)
WBC # UR: (no result) #/HPF (ref 0–3)

## 2021-06-24 PROCEDURE — 87798 DETECT AGENT NOS DNA AMP: CPT

## 2021-06-24 PROCEDURE — 87581 M.PNEUMON DNA AMP PROBE: CPT

## 2021-06-24 PROCEDURE — 87633 RESP VIRUS 12-25 TARGETS: CPT

## 2021-06-24 PROCEDURE — 36415 COLL VENOUS BLD VENIPUNCTURE: CPT

## 2021-06-24 PROCEDURE — 85025 COMPLETE CBC W/AUTO DIFF WBC: CPT

## 2021-06-24 PROCEDURE — 71045 X-RAY EXAM CHEST 1 VIEW: CPT

## 2021-06-24 PROCEDURE — 81001 URINALYSIS AUTO W/SCOPE: CPT

## 2022-04-18 ENCOUNTER — HOSPITAL ENCOUNTER (EMERGENCY)
Age: 47
LOS: 1 days | Discharge: HOME | End: 2022-04-19
Payer: COMMERCIAL

## 2022-04-18 VITALS
RESPIRATION RATE: 19 BRPM | DIASTOLIC BLOOD PRESSURE: 85 MMHG | OXYGEN SATURATION: 97 % | SYSTOLIC BLOOD PRESSURE: 121 MMHG | HEART RATE: 87 BPM | TEMPERATURE: 97.52 F

## 2022-04-18 VITALS — BODY MASS INDEX: 27.4 KG/M2

## 2022-04-18 DIAGNOSIS — Q33.6: ICD-10-CM

## 2022-04-18 DIAGNOSIS — Z86.14: ICD-10-CM

## 2022-04-18 DIAGNOSIS — Z79.899: ICD-10-CM

## 2022-04-18 DIAGNOSIS — I50.9: ICD-10-CM

## 2022-04-18 DIAGNOSIS — Z79.52: ICD-10-CM

## 2022-04-18 DIAGNOSIS — K21.9: ICD-10-CM

## 2022-04-18 DIAGNOSIS — Z79.51: ICD-10-CM

## 2022-04-18 DIAGNOSIS — E11.9: ICD-10-CM

## 2022-04-18 DIAGNOSIS — Z85.9: ICD-10-CM

## 2022-04-18 DIAGNOSIS — J45.41: Primary | ICD-10-CM

## 2022-04-18 DIAGNOSIS — Z87.442: ICD-10-CM

## 2022-04-18 DIAGNOSIS — I11.0: ICD-10-CM

## 2022-04-18 PROCEDURE — 86140 C-REACTIVE PROTEIN: CPT

## 2022-04-18 PROCEDURE — 85025 COMPLETE CBC W/AUTO DIFF WBC: CPT

## 2022-04-18 PROCEDURE — 85651 RBC SED RATE NONAUTOMATED: CPT

## 2022-04-18 PROCEDURE — 84484 ASSAY OF TROPONIN QUANT: CPT

## 2022-04-18 PROCEDURE — 83605 ASSAY OF LACTIC ACID: CPT

## 2022-04-18 PROCEDURE — 93005 ELECTROCARDIOGRAM TRACING: CPT

## 2022-04-18 PROCEDURE — 87040 BLOOD CULTURE FOR BACTERIA: CPT

## 2022-04-18 PROCEDURE — 71275 CT ANGIOGRAPHY CHEST: CPT

## 2022-04-18 PROCEDURE — 96361 HYDRATE IV INFUSION ADD-ON: CPT

## 2022-04-18 PROCEDURE — 96375 TX/PRO/DX INJ NEW DRUG ADDON: CPT

## 2022-04-18 PROCEDURE — 96365 THER/PROPH/DIAG IV INF INIT: CPT

## 2022-04-18 PROCEDURE — 99285 EMERGENCY DEPT VISIT HI MDM: CPT

## 2022-04-18 PROCEDURE — 96374 THER/PROPH/DIAG INJ IV PUSH: CPT

## 2022-04-18 PROCEDURE — 71046 X-RAY EXAM CHEST 2 VIEWS: CPT

## 2022-04-18 PROCEDURE — 80053 COMPREHEN METABOLIC PANEL: CPT

## 2022-04-18 PROCEDURE — 84145 PROCALCITONIN (PCT): CPT

## 2022-04-19 VITALS — HEART RATE: 69 BPM | SYSTOLIC BLOOD PRESSURE: 116 MMHG | DIASTOLIC BLOOD PRESSURE: 83 MMHG | OXYGEN SATURATION: 100 %

## 2022-04-19 VITALS
HEART RATE: 77 BPM | RESPIRATION RATE: 18 BRPM | OXYGEN SATURATION: 98 % | SYSTOLIC BLOOD PRESSURE: 109 MMHG | DIASTOLIC BLOOD PRESSURE: 68 MMHG | TEMPERATURE: 98.1 F

## 2022-04-19 VITALS — SYSTOLIC BLOOD PRESSURE: 107 MMHG | DIASTOLIC BLOOD PRESSURE: 66 MMHG | OXYGEN SATURATION: 98 % | HEART RATE: 77 BPM

## 2022-04-19 VITALS — HEART RATE: 72 BPM | SYSTOLIC BLOOD PRESSURE: 116 MMHG | DIASTOLIC BLOOD PRESSURE: 79 MMHG | OXYGEN SATURATION: 99 %

## 2022-04-19 VITALS — OXYGEN SATURATION: 100 % | HEART RATE: 80 BPM | DIASTOLIC BLOOD PRESSURE: 68 MMHG | SYSTOLIC BLOOD PRESSURE: 121 MMHG

## 2022-04-19 VITALS — OXYGEN SATURATION: 99 % | HEART RATE: 78 BPM | DIASTOLIC BLOOD PRESSURE: 69 MMHG | SYSTOLIC BLOOD PRESSURE: 117 MMHG

## 2022-04-19 VITALS — HEART RATE: 77 BPM

## 2022-04-19 LAB
ALBUMIN LEVEL: 4.1 G/DL (ref 3.5–5)
ALBUMIN/GLOB SERPL: 1.6 {RATIO} (ref 1.1–1.8)
ALP ISO SERPL-ACNC: 76 U/L (ref 38–126)
ALT SERPLBLD-CCNC: 26 U/L (ref 12–78)
ANION GAP SERPL CALC-SCNC: 9.7 MEQ/L (ref 5–15)
AST SERPL QL: 20 U/L (ref 14–36)
BILIRUBIN,TOTAL: 0.4 MG/DL (ref 0.2–1.3)
BUN SERPL-MCNC: 16 MG/DL (ref 7–17)
CALCIUM SPEC-MCNC: 8.7 MG/DL (ref 8.4–10.2)
CHLORIDE SPEC-SCNC: 106 MMOL/L (ref 98–107)
CO2 SERPL-SCNC: 27 MMOL/L (ref 22–30)
CREAT BLD-SCNC: 0.8 MG/DL (ref 0.52–1.04)
CREATININE CLEARANCE ESTIMATED: 93 ML/MIN (ref 50–200)
CRP SERPL HS-MCNC: 3 MG/L (ref 0–4)
ESTIMATED GLOMERULAR FILT RATE: 77 ML/MIN (ref 60–?)
GFR (AFRICAN AMERICAN): 93 ML/MIN (ref 60–?)
GLOBULIN SER CALC-MCNC: 2.5 G/DL (ref 1.3–3.2)
GLUCOSE: 112 MG/DL (ref 74–100)
HCT VFR BLD CALC: 41.1 % (ref 37–47)
HGB BLD-MCNC: 13.9 G/DL (ref 12.2–16.2)
MCHC RBC-ENTMCNC: 33.9 G/DL (ref 31.8–35.4)
MCV RBC: 85.2 FL (ref 81–99)
MEAN CORPUSCULAR HEMOGLOBIN: 28.9 PG (ref 27–31.2)
PLATELET # BLD: 297 K/MM3 (ref 142–424)
POTASSIUM: 3.7 MMOL/L (ref 3.5–5.1)
PROCALCITONIN: 0.04 NG/ML (ref 0–2)
PROCALCITONIN: 0.04 NG/ML (ref 0–2)
PROT SERPL-MCNC: 6.6 G/DL (ref 6.3–8.2)
RBC # BLD AUTO: 4.83 M/MM3 (ref 4.2–5.4)
SODIUM SPEC-SCNC: 139 MMOL/L (ref 136–145)
TROPONIN I: < 0.01 NG/ML (ref 0–0.03)
WBC # BLD AUTO: 5.9 K/MM3 (ref 4.8–10.8)

## 2024-12-10 ENCOUNTER — HOSPITAL ENCOUNTER (OUTPATIENT)
Age: 49
Discharge: HOME | End: 2024-12-10
Payer: COMMERCIAL

## 2024-12-10 DIAGNOSIS — R07.9: Primary | ICD-10-CM

## 2024-12-10 PROCEDURE — 71275 CT ANGIOGRAPHY CHEST: CPT

## 2024-12-18 ENCOUNTER — HOSPITAL ENCOUNTER (OUTPATIENT)
Age: 49
Discharge: HOME | End: 2024-12-18
Payer: COMMERCIAL

## 2024-12-18 VITALS
TEMPERATURE: 98 F | RESPIRATION RATE: 16 BRPM | HEART RATE: 60 BPM | SYSTOLIC BLOOD PRESSURE: 90 MMHG | OXYGEN SATURATION: 100 % | DIASTOLIC BLOOD PRESSURE: 54 MMHG

## 2024-12-18 VITALS
DIASTOLIC BLOOD PRESSURE: 57 MMHG | RESPIRATION RATE: 16 BRPM | SYSTOLIC BLOOD PRESSURE: 92 MMHG | OXYGEN SATURATION: 100 % | HEART RATE: 61 BPM

## 2024-12-18 VITALS
DIASTOLIC BLOOD PRESSURE: 69 MMHG | HEART RATE: 63 BPM | OXYGEN SATURATION: 100 % | SYSTOLIC BLOOD PRESSURE: 116 MMHG | TEMPERATURE: 98 F | RESPIRATION RATE: 17 BRPM

## 2024-12-18 VITALS
DIASTOLIC BLOOD PRESSURE: 59 MMHG | OXYGEN SATURATION: 100 % | HEART RATE: 59 BPM | SYSTOLIC BLOOD PRESSURE: 103 MMHG | RESPIRATION RATE: 17 BRPM

## 2024-12-18 VITALS
DIASTOLIC BLOOD PRESSURE: 73 MMHG | OXYGEN SATURATION: 100 % | RESPIRATION RATE: 18 BRPM | HEART RATE: 61 BPM | SYSTOLIC BLOOD PRESSURE: 106 MMHG

## 2024-12-18 VITALS — BODY MASS INDEX: 24.7 KG/M2

## 2024-12-18 DIAGNOSIS — R07.9: Primary | ICD-10-CM

## 2024-12-18 LAB
ANION GAP SERPL CALC-SCNC: 5.5 MEQ/L (ref 5–15)
BUN SERPL-MCNC: 17 MG/DL (ref 7–17)
CALCIUM SPEC-MCNC: 8.7 MG/DL (ref 8.4–10.2)
CHLORIDE SPEC-SCNC: 110 MMOL/L (ref 98–107)
CO2 SERPL-SCNC: 26 MMOL/L (ref 22–30)
CREAT BLD-SCNC: 0.7 MG/DL (ref 0.52–1.04)
CREATININE CLEARANCE ESTIMATED: 93 ML/MIN (ref 50–200)
ESTIMATED GLOMERULAR FILT RATE: 89 ML/MIN (ref 60–?)
GFR (AFRICAN AMERICAN): 107 ML/MIN (ref 60–?)
GLUCOSE: 87 MG/DL (ref 74–100)
POTASSIUM: 4.5 MMOL/L (ref 3.5–5.1)
SODIUM SPEC-SCNC: 137 MMOL/L (ref 136–145)

## 2024-12-18 PROCEDURE — 75574 CT ANGIO HRT W/3D IMAGE: CPT

## 2024-12-18 PROCEDURE — 80048 BASIC METABOLIC PNL TOTAL CA: CPT
